# Patient Record
Sex: MALE | Race: WHITE | Employment: FULL TIME | URBAN - METROPOLITAN AREA
[De-identification: names, ages, dates, MRNs, and addresses within clinical notes are randomized per-mention and may not be internally consistent; named-entity substitution may affect disease eponyms.]

---
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
## 2021-09-09 ENCOUNTER — HOSPITAL ENCOUNTER (EMERGENCY)
Age: 21
Discharge: HOME | End: 2021-09-09
Attending: EMERGENCY MEDICINE
Payer: COMMERCIAL

## 2021-09-09 VITALS
TEMPERATURE: 96.7 F | OXYGEN SATURATION: 99 % | HEART RATE: 61 BPM | RESPIRATION RATE: 18 BRPM | WEIGHT: 208 LBS | SYSTOLIC BLOOD PRESSURE: 146 MMHG | BODY MASS INDEX: 29.12 KG/M2 | DIASTOLIC BLOOD PRESSURE: 82 MMHG | HEIGHT: 71 IN

## 2021-09-09 DIAGNOSIS — S29.019A THORACIC MYOFASCIAL STRAIN, INITIAL ENCOUNTER: ICD-10-CM

## 2021-09-09 DIAGNOSIS — S39.012A BACK STRAIN, INITIAL ENCOUNTER: Primary | ICD-10-CM

## 2021-09-09 PROCEDURE — 6370000000 HC RX 637 (ALT 250 FOR IP): Performed by: EMERGENCY MEDICINE

## 2021-09-09 PROCEDURE — 96372 THER/PROPH/DIAG INJ SC/IM: CPT

## 2021-09-09 PROCEDURE — 6360000002 HC RX W HCPCS: Performed by: EMERGENCY MEDICINE

## 2021-09-09 PROCEDURE — 99283 EMERGENCY DEPT VISIT LOW MDM: CPT

## 2021-09-09 RX ORDER — NAPROXEN 500 MG/1
500 TABLET ORAL 2 TIMES DAILY PRN
Qty: 60 TABLET | Refills: 0 | Status: SHIPPED | OUTPATIENT
Start: 2021-09-09

## 2021-09-09 RX ORDER — KETOROLAC TROMETHAMINE 30 MG/ML
30 INJECTION, SOLUTION INTRAMUSCULAR; INTRAVENOUS ONCE
Status: COMPLETED | OUTPATIENT
Start: 2021-09-09 | End: 2021-09-09

## 2021-09-09 RX ORDER — METHOCARBAMOL 500 MG/1
1000 TABLET, FILM COATED ORAL ONCE
Status: COMPLETED | OUTPATIENT
Start: 2021-09-09 | End: 2021-09-09

## 2021-09-09 RX ORDER — METHOCARBAMOL 500 MG/1
1000 TABLET, FILM COATED ORAL 2 TIMES DAILY PRN
Qty: 40 TABLET | Refills: 0 | Status: SHIPPED | OUTPATIENT
Start: 2021-09-09 | End: 2021-09-19

## 2021-09-09 RX ORDER — METHYLPREDNISOLONE SODIUM SUCCINATE 125 MG/2ML
80 INJECTION, POWDER, LYOPHILIZED, FOR SOLUTION INTRAMUSCULAR; INTRAVENOUS ONCE
Status: COMPLETED | OUTPATIENT
Start: 2021-09-09 | End: 2021-09-09

## 2021-09-09 RX ADMIN — KETOROLAC TROMETHAMINE 30 MG: 30 INJECTION, SOLUTION INTRAMUSCULAR at 09:36

## 2021-09-09 RX ADMIN — METHOCARBAMOL 1000 MG: 500 TABLET ORAL at 09:37

## 2021-09-09 RX ADMIN — METHYLPREDNISOLONE SODIUM SUCCINATE 80 MG: 125 INJECTION, POWDER, FOR SOLUTION INTRAMUSCULAR; INTRAVENOUS at 09:37

## 2021-09-09 ASSESSMENT — PAIN SCALES - GENERAL
PAINLEVEL_OUTOF10: 6
PAINLEVEL_OUTOF10: 5

## 2021-09-09 ASSESSMENT — PAIN DESCRIPTION - DESCRIPTORS: DESCRIPTORS: SHARP

## 2021-09-09 ASSESSMENT — PAIN DESCRIPTION - ORIENTATION: ORIENTATION: MID

## 2021-09-09 ASSESSMENT — PAIN DESCRIPTION - LOCATION: LOCATION: BACK

## 2021-09-09 NOTE — ED PROVIDER NOTES
[address] ED      CHIEF COMPLAINT  Back Pain (unknown origin; pain woke pt up in middle of night)       HISTORY OF PRESENT ILLNESS  Leigh Ann Abrams is a 24 y.o. male  who presents to the ED complaining of back pain. The patient states that he has a history of shoulder and back issues, he moved last night and then the pain started. He took Tylenol for pain without much relief. He states that the pain is in his left-sided mid back, states that it somewhat moves around and is now traveling to his lower back. He denies numbness, tingling, or weakness of the extremities. He denies any associated chest or abdominal pain. Denies shortness of breath. He states he occasionally has some pain radiating into his left buttock but not today. He denies fevers or chills. Denies IV drug use. Denies urinary symptoms. Denies any significant medical problems. No other complaints, modifying factors or associated symptoms. I have reviewed the following from the nursing documentation. Past Medical History:   Diagnosis Date    ADHD (attention deficit hyperactivity disorder)     Anxiety     Asperger syndrome      History reviewed. No pertinent surgical history. History reviewed. No pertinent family history.   Social History     Socioeconomic History    Marital status: Single     Spouse name: Not on file    Number of children: Not on file    Years of education: Not on file    Highest education level: Not on file   Occupational History    Not on file   Tobacco Use    Smoking status: Current Every Day Smoker    Smokeless tobacco: Never Used   Substance and Sexual Activity    Alcohol use: Yes     Comment: occ    Drug use: Yes     Types: Marijuana    Sexual activity: Not on file   Other Topics Concern    Not on file   Social History Narrative    Not on file     Social Determinants of Health     Financial Resource Strain:     Difficulty of Paying Living Expenses:    Food Insecurity:     Worried About Running Out of Food in the Last Year:     Kevin of Food in the Last Year:    Transportation Needs:     Lack of Transportation (Medical):  Lack of Transportation (Non-Medical):    Physical Activity:     Days of Exercise per Week:     Minutes of Exercise per Session:    Stress:     Feeling of Stress :    Social Connections:     Frequency of Communication with Friends and Family:     Frequency of Social Gatherings with Friends and Family:     Attends Bahai Services:     Active Member of Clubs or Organizations:     Attends Club or Organization Meetings:     Marital Status:    Intimate Partner Violence:     Fear of Current or Ex-Partner:     Emotionally Abused:     Physically Abused:     Sexually Abused:      Current Facility-Administered Medications   Medication Dose Route Frequency Provider Last Rate Last Admin    methylPREDNISolone sodium (SOLU-MEDROL) injection 80 mg  80 mg IntraMUSCular Once [employer], DO        ketorolac (TORADOL) injection 30 mg  30 mg IntraMUSCular Once [employer], DO        methocarbamol (ROBAXIN) tablet 1,000 mg  1,000 mg Oral Once [employer], DO         Current Outpatient Medications   Medication Sig Dispense Refill    methocarbamol (ROBAXIN) 500 MG tablet Take 2 tablets by mouth 2 times daily as needed (back pain) 40 tablet 0    naproxen (NAPROSYN) 500 MG tablet Take 1 tablet by mouth 2 times daily as needed for Pain 60 tablet 0    diclofenac (VOLTAREN) 50 MG EC tablet Take 1 tablet by mouth 2 times daily 14 tablet 0    ARIPiprazole (ABILIFY) 5 MG tablet Take 5 mg by mouth nightly       Allergies   Allergen Reactions    Penicillins     Sulfa Antibiotics        REVIEW OF SYSTEMS  10 systems reviewed, pertinent positives per HPI otherwise noted to be negative.     PHYSICAL EXAM  BP (!) 146/82   Pulse 60   Temp 96.7 °F (35.9 °C)   Resp 16   Ht 5' 11\" (1.803 m)   Wt 208 lb (94.3 kg)   SpO2 100%   BMI 29.01 kg/m²    Physical exam:  General appearance: awake and cooperative. No distress. Non toxic appearing. Skin: Warm and dry. No rashes or lesions. HENT: Normocephalic. Atraumatic. Mucus membranes are moist  Neck: supple  Eyes: SHAMIKA. EOM intact. Heart: RRR. No murmurs. Lungs: Respirations unlabored. CTAB. No wheezes, rales, or rhonchi. Good air exchange  Abdomen: No tenderness. Soft. Non distended. No peritoneal signs. Musculoskeletal: tenderness to palpation left thoracic paraspinals with hypertonic musculature, no midline tenderness. No extremity edema. Compartments soft. No deformity. No tenderness in the extremities. All extremities neurovascularly intact. Radial, Dp, and PT pulses +2/4 bilaterally  Neurological: Alert and oriented. No focal deficits. No aphasia or dysarthria. No gait ataxia. Psychiatric: Normal mood and affect. High Sensitivity Neuro Exam (HSNE) Spine  If Lumbar Pain (also check femoral pulses):  L1-L2 Cremasteric Reflex (inner thigh sensation): Present  L2 Adduct Thigh (cross legs): Present  L3 Extend Knee: Present  L4 Dorsiflex Ankle (Up): Present  L5 Point Great Toe Up: Present  L2 L3-L4 Knee Reflex: Present  S1 Flex Knee: Present  S2 Plantarflex Toes: Present  S3, 4, 5 Groin/Perianal Sensation: Present       If Thoracic Back Pain (also check femoral pulses):  T1 Move fingers apart: Present  T2-T12 Trunk Sensation: Present      LABS  I have reviewed all labs for this visit. No results found for this visit on 09/09/21. ECG    RADIOLOGY      ED COURSE/MDM  Patient seen and evaluated. Old records reviewed. Labs and imaging reviewed and results discussed with patient. Patient presents for back pain. Vital signs are within normal limits. He is neurologically intact and well-appearing on exam.  He appears to have a muscle strain. I do not feel imaging would add to clinical picture at this time. there is no sign of cauda equina or acute spinal cord pathology today.  He is treated symptomatically with Toradol, Robaxin and Solu-Medrol. He is given a prescription for naproxen and Robaxin for home. He is told to follow-up with primary care. He is given strict return precautions back to the ED and voices understanding. Red Flags    Minor (1 Point Each)  Alcohol Abuse: +0 No  Diabetes Mellitus: +0 No  Renal Failure: +0 No  Night Pain: +1 yes  3rd visit in last 20 days: +0 No    Major (3 Points Each)  IV Drug Use: +0 No  Fever without focus: +0 No  Recent/Current Systemic Infection: +0 No  Immunosuppression (can include chemotherapy, advanced cancer, severe malnutrition, chronic immunosuppressive drugs): +0 No  Recent Spinal Fracture/Procedure (if patient is also on anticoagulation [warfarin, heparin, and NOAC] strongly consider MRI): +0 No  Incontinence or retention: +0 No  Indwelling urinary catheter: +0 No    Red flag score: 1    Patient presents to ED for evaluation of back pain. No history of direct trauma. Neurovascular exam in the ER is unremarkable for any deficits. Vitals signs have been reviewed and are stable. They are afebrile and nontoxic appearing, but in moderate distress due to pain. Pain was addressed with pain medication. HSNE Spine was without abnormal findings and Red Flag Score evaluated. Red flag score was less than or equal to 3 therefore ESR was not ordered. Further imaging was not ordered because red flag score was less than or equal to 3..      I completed a structured, evidence-based clinical evaluation to screen for acute non-traumatic spinal emergencies. The patient has a normal detailed neurologic exam and a low red flag score. The evidence indicates that the patient is very low risk for an acute spinal emergency and this is consistent with my clinical intuition.  The risk of further workup or hospitalization is likely higher than the likelihood of the patient having a spinal epidural abscess or other dangerous emergency spinal condition It is, therefore, in the patients best interest not to do additional emergent testing. During the patient's ED course, the patient was given:  Medications   methylPREDNISolone sodium (SOLU-MEDROL) injection 80 mg (has no administration in time range)   ketorolac (TORADOL) injection 30 mg (has no administration in time range)   methocarbamol (ROBAXIN) tablet 1,000 mg (has no administration in time range)        CLINICAL IMPRESSION  1. Back strain, initial encounter    2. Thoracic myofascial strain, initial encounter        Blood pressure (!) 146/82, pulse 60, temperature 96.7 °F (35.9 °C), resp. rate 16, height 5' 11\" (1.803 m), weight 208 lb (94.3 kg), SpO2 100 %. Patient was given scripts for the following medications. I counseled patient how to take these medications. New Prescriptions    METHOCARBAMOL (ROBAXIN) 500 MG TABLET    Take 2 tablets by mouth 2 times daily as needed (back pain)    NAPROXEN (NAPROSYN) 500 MG TABLET    Take 1 tablet by mouth 2 times daily as needed for Pain       Follow-up with:  Ana [address] [place] [address] Dr Gwyn Cox 19 [phone]    Call in 1 day        DISCLAIMER: This chart was created using Dragon dictation software. Efforts were made by me to ensure accuracy, however some errors may be present due to limitations of this technology and occasionally words are not transcribed correctly.            Magdaleno Albarran, [place]  09/09/21 [id number]

## 2021-09-09 NOTE — DISCHARGE INSTR - COC
Continuity of Care Form    Patient Name: Juaquin Raya   :  2000  MRN:  [id number]    Admit date:  2021  Discharge date:  ***    Code Status Order: No Order   Advance Directives:     Admitting Physician:  No admitting provider for patient encounter. PCP: Keaton Phelan    Discharging Nurse: [facility] Unit/Room#: T1-3/T1  Discharging Unit Phone Number: ***    Emergency Contact:   Extended Emergency Contact Information  Primary Emergency Contact: Noella Bloch [address] Phone: [phone]  Relation: Parent  Secondary Emergency Contact: Urszula Mortensen [address] Phone: [phone]  Relation: Parent    Past Surgical History:  History reviewed. No pertinent surgical history. Immunization History: There is no immunization history on file for this patient.     Active Problems:  Patient Active Problem List   Diagnosis Code    Closed fracture of distal end of ulna (alone) S52.609A       Isolation/Infection:   Isolation          No Isolation        Patient Infection Status     None to display          Nurse Assessment:  Last Vital Signs: BP (!) 146/82   Pulse 61   Temp 96.7 °F (35.9 °C)   Resp 18   Ht 5' 11\" (1.803 m)   Wt 208 lb (94.3 kg)   SpO2 99%   BMI 29.01 kg/m²     Last documented pain score (0-10 scale): Pain Level: 6  Last Weight:   Wt Readings from Last 1 Encounters:   21 208 lb (94.3 kg)     Mental Status:  {IP PT MENTAL STATUS:31455}    IV Access:  { MARIA INES IV ACCESS:672649378}    Nursing Mobility/ADLs:  Walking   {CHP DME ZEDA:061601081}  Transfer  {CHP DME AHMX:825673376}  Bathing  {CHP DME TAGA:345198485}  Dressing  {CHP DME VSEB:786151241}  Toileting  {CHP DME QWMT:993102983}  Feeding  {CHP DME TOGI:486892308}  Med Admin  {CHP DME KYCO:744617633}  Med Delivery   { MARIA INES MED Delivery:889705486}    Wound Care Documentation and Therapy:        Elimination:  Continence:   · Bowel: {YES / UT:16733}  · Bladder: {YES / LS:22929}  Urinary Catheter: {Urinary Catheter:651073793}   Colostomy/Ileostomy/Ileal Conduit: {YES / NH:30570}       Date of Last BM: ***  No intake or output data in the 24 hours ending 21 0939  No intake/output data recorded.     Safety Concerns:     [address] MARIA INES Safety Concerns:762897003}    Impairments/Disabilities:      [address] [facility] Impairments/Disabilities:750865662}    Nutrition Therapy:  Current Nutrition Therapy:   [address] [facility] Diet List:741831394}    Routes of Feeding: {CHP DME Other Feedings:073703759}  Liquids: {Slp liquid thickness:05751}  Daily Fluid Restriction: {CHP DME Yes amt example:930579180}  Last Modified Barium Swallow with Video (Video Swallowing Test): {Done Not Done XWXB:903874556}    Treatments at the Time of Hospital Discharge:   Respiratory Treatments: ***  Oxygen Therapy:  {Therapy; copd oxygen:17703}  Ventilator:    { CC Vent UDT}    Rehab Therapies: {THERAPEUTIC INTERVENTION:9066302642}  Weight Bearing Status/Restrictions: [address] [place] [address] Weight Bearin}  Other Medical Equipment (for information only, NOT a DME order):  {EQUIPMENT:979193790}  Other Treatments: ***    Patient's personal belongings (please select all that are sent with patient):  {[facility] DME Belongings:733544984}    RN SIGNATURE:  {Esignature:711862183}    CASE MANAGEMENT/SOCIAL WORK SECTION    Inpatient Status Date: ***    Readmission Risk Assessment Score:  Readmission Risk              Risk of Unplanned Readmission:  0           Discharging to Facility/ Agency   · Name:   · Address:  · Phone:  · Fax:    Dialysis Facility (if applicable)   · Name:  · Address:  · Dialysis Schedule:  · Phone:  · Fax:    / signature: {Esignature:654142687}    PHYSICIAN SECTION    Prognosis: {Prognosis:6171327850}    Condition at Discharge: [address] Patient Condition:729810903}    Rehab Potential (if transferring to Rehab): {Prognosis:0720878212}    Recommended Labs or Other Treatments After Discharge: ***    Physician Certification: I certify the above information and transfer of Jazmyn Cintron  is necessary for the continuing treatment of the diagnosis listed and that he requires {Admit to Appropriate Level of Care:98275} for {GREATER/LESS:693383922} 30 days.      Update Admission H&P: {CHP DME Changes in MSQZQ:848727540}    PHYSICIAN SIGNATURE:  {Esignature:067187200}

## 2021-09-10 ENCOUNTER — OFFICE VISIT (OUTPATIENT)
Dept: PRIMARY CARE CLINIC | Age: 21
End: 2021-09-10
Payer: COMMERCIAL

## 2021-09-10 VITALS
SYSTOLIC BLOOD PRESSURE: 122 MMHG | DIASTOLIC BLOOD PRESSURE: 76 MMHG | HEIGHT: 70 IN | TEMPERATURE: 98.5 F | WEIGHT: 225 LBS | BODY MASS INDEX: 32.21 KG/M2

## 2021-09-10 DIAGNOSIS — F32.A DEPRESSION, UNSPECIFIED DEPRESSION TYPE: ICD-10-CM

## 2021-09-10 DIAGNOSIS — F17.200 TOBACCO USE DISORDER: ICD-10-CM

## 2021-09-10 DIAGNOSIS — F12.20 TETRAHYDROCANNABINOL (THC) USE DISORDER, MODERATE, DEPENDENCE (HCC): ICD-10-CM

## 2021-09-10 DIAGNOSIS — F90.9 ATTENTION DEFICIT HYPERACTIVITY DISORDER (ADHD), UNSPECIFIED ADHD TYPE: ICD-10-CM

## 2021-09-10 DIAGNOSIS — E66.9 OBESITY (BMI 30-39.9): ICD-10-CM

## 2021-09-10 DIAGNOSIS — F19.11 HISTORY OF SUBSTANCE ABUSE (HCC): ICD-10-CM

## 2021-09-10 DIAGNOSIS — Z76.89 ESTABLISHING CARE WITH NEW DOCTOR, ENCOUNTER FOR: ICD-10-CM

## 2021-09-10 DIAGNOSIS — F41.9 ANXIETY: ICD-10-CM

## 2021-09-10 DIAGNOSIS — S39.012A BACK STRAIN, INITIAL ENCOUNTER: Primary | ICD-10-CM

## 2021-09-10 PROCEDURE — 99203 OFFICE O/P NEW LOW 30 MIN: CPT

## 2021-09-10 ASSESSMENT — PATIENT HEALTH QUESTIONNAIRE - PHQ9
1. LITTLE INTEREST OR PLEASURE IN DOING THINGS: 0
2. FEELING DOWN, DEPRESSED OR HOPELESS: 0
SUM OF ALL RESPONSES TO PHQ9 QUESTIONS 1 & 2: 0
SUM OF ALL RESPONSES TO PHQ QUESTIONS 1-9: 0
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
## 2021-09-10 NOTE — PATIENT INSTRUCTIONS
Patient Education        Learning About Relief for Back Pain  What is back strain? Back strain is an injury that happens when you overstretch, or pull, a muscle in your back. You may hurt your back in an accident or when you exercise or lift something. Most back pain gets better with rest and time. You can take care of yourself at home to help your back heal.  What can you do first to relieve back pain? When you first feel back pain, try these steps:  · Walk. Take a short walk (10 to 20 minutes) on a level surface (no slopes, hills, or stairs) every 2 to 3 hours. Walk only distances you can manage without pain, especially leg pain. · Relax. Find a comfortable position for rest. Some people are comfortable on the floor or a medium-firm bed with a small pillow under their head and another under their knees. Some people prefer to lie on their side with a pillow between their knees. Don't stay in one position for too long. · Try heat or ice. Try using a heating pad on a low or medium setting, or take a warm shower, for 15 to 20 minutes every 2 to 3 hours. Or you can buy single-use heat wraps that last up to 8 hours. You can also try an ice pack for 10 to 15 minutes every 2 to 3 hours. You can use an ice pack or a bag of frozen vegetables wrapped in a thin towel. There is not strong evidence that either heat or ice will help, but you can try them to see if they help. You may also want to try switching between heat and cold. · Take pain medicine exactly as directed. ? If the doctor gave you a prescription medicine for pain, take it as prescribed. ? If you are not taking a prescription pain medicine, ask your doctor if you can take an over-the-counter medicine. What else can you do? · Stretch and exercise. Exercises that increase flexibility may relieve your pain and make it easier for your muscles to keep your spine in a good, neutral position. And don't forget to keep walking. · Do self-massage.  You can

## 2021-09-11 ASSESSMENT — ENCOUNTER SYMPTOMS
COUGH: 0
VOMITING: 0
SORE THROAT: 0
BACK PAIN: 1
ABDOMINAL PAIN: 0
SHORTNESS OF BREATH: 0
NAUSEA: 0
DIARRHEA: 0

## 2023-03-08 ENCOUNTER — HOSPITAL ENCOUNTER (EMERGENCY)
Age: 23
Discharge: HOME | End: 2023-03-08
Attending: STUDENT IN AN ORGANIZED HEALTH CARE EDUCATION/TRAINING PROGRAM

## 2023-03-08 ENCOUNTER — APPOINTMENT (OUTPATIENT)
Dept: GENERAL RADIOLOGY | Age: 23
End: 2023-03-08

## 2023-03-08 VITALS
TEMPERATURE: 97.9 F | HEART RATE: 55 BPM | WEIGHT: 200 LBS | BODY MASS INDEX: 27.09 KG/M2 | RESPIRATION RATE: 16 BRPM | HEIGHT: 72 IN | OXYGEN SATURATION: 100 % | SYSTOLIC BLOOD PRESSURE: 128 MMHG | DIASTOLIC BLOOD PRESSURE: 75 MMHG

## 2023-03-08 DIAGNOSIS — M25.512 ACUTE PAIN OF LEFT SHOULDER: Primary | ICD-10-CM

## 2023-03-08 PROCEDURE — 96374 THER/PROPH/DIAG INJ IV PUSH: CPT

## 2023-03-08 PROCEDURE — 6360000002 HC RX W HCPCS: Performed by: STUDENT IN AN ORGANIZED HEALTH CARE EDUCATION/TRAINING PROGRAM

## 2023-03-08 PROCEDURE — 99284 EMERGENCY DEPT VISIT MOD MDM: CPT

## 2023-03-08 PROCEDURE — 73030 X-RAY EXAM OF SHOULDER: CPT

## 2023-03-08 PROCEDURE — 6370000000 HC RX 637 (ALT 250 FOR IP): Performed by: STUDENT IN AN ORGANIZED HEALTH CARE EDUCATION/TRAINING PROGRAM

## 2023-03-08 RX ORDER — KETOROLAC TROMETHAMINE 30 MG/ML
15 INJECTION, SOLUTION INTRAMUSCULAR; INTRAVENOUS ONCE
Status: COMPLETED | OUTPATIENT
Start: 2023-03-08 | End: 2023-03-08

## 2023-03-08 RX ORDER — ACETAMINOPHEN 325 MG/1
650 TABLET ORAL ONCE
Status: COMPLETED | OUTPATIENT
Start: 2023-03-08 | End: 2023-03-08

## 2023-03-08 RX ADMIN — KETOROLAC TROMETHAMINE 15 MG: 30 INJECTION, SOLUTION INTRAMUSCULAR at 07:15

## 2023-03-08 RX ADMIN — ACETAMINOPHEN 650 MG: 325 TABLET ORAL at 07:15

## 2023-03-08 NOTE — DISCHARGE INSTRUCTIONS
Follow-up with orthopedic physician. Call them today to set up follow-up appointment as soon as able. I want you to ice your shoulder 20 minutes 3 times daily as well as rest.  Return to emergency department if you begin having fevers, redness around the site, difficulty ranging the joint, any neck or back pain, motor or sensory changes, chest pain or shortness of breath or any new change or worsening symptoms were always here for evaluation never hesitate to return.   Take Motrin Tylenol at home for pain control

## 2023-03-08 NOTE — Clinical Note
Emma Allen was seen and treated in our emergency department on 3/8/2023. He may return to work on 03/09/2023. If you have any questions or concerns, please don't hesitate to call.       Geneva Norman MD

## 2023-03-08 NOTE — ED PROVIDER NOTES
Emergency Department Encounter    Patient: Lenka Garnica  MRN: [id number]  : 2000  Date of Evaluation: 3/8/2023  ED Provider:  Kay Mayo MD    Triage Chief Complaint:   Shoulder Pain (Pt with Left shoulder pain that he states is shooting down the back of arm to elbow. States he has a prior injury on same shoulder. Pt states he did nothing to aggravate the shoulder. )    [place]:  Lenka Garnica is a 24 y.o. male with history seen below presenting with left shoulder pain. Patient states he has had intermittent pain in his left shoulder previously. Patient states overnight he began having pain over the anterior and posterior aspect of his left shoulder which has been constant, stabbing, worse with palpation and certain movements without relieving factors. Patient states he has intermittent shooting pain down to his elbow. Denies any tenderness over the mid to distal humerus, elbow, forearm, wrist or hand. Denies any neck pain or paraspinous pain. Denies recent falls or trauma. Denies headache, blurred vision, focal neurodeficits, lightheadedness or dizziness. Denies any pain along the CT or L-spine. Denies any chest pain, shortness of breath, cough, sputum production. Denies abdominal pain, nausea vomiting, diarrhea constipation or urinary symptoms. Patient denies any significant motor or sensory changes. Denies any bowel or bladder changes or incontinence denies fevers or chills or infectious symptoms. ROS - see HPI, below listed is current ROS at time of my eval:  At least 14 systems reviewed, negative other HPI    Past Medical History:   Diagnosis Date    ADHD (attention deficit hyperactivity disorder)     Anxiety     Asperger syndrome      Past Surgical History:   Procedure Laterality Date    HAND SURGERY Right     5th digit metacarpal    KNEE CARTILAGE SURGERY Left     WISDOM TOOTH EXTRACTION       History reviewed. No pertinent family history.   Social History     Socioeconomic History    Marital status: Single     Spouse name: Not on file    Number of children: Not on file    Years of education: Not on file    Highest education level: Not on file   Occupational History    Not on file   Tobacco Use    Smoking status: Every Day     Types: Cigarettes    Smokeless tobacco: Never    Tobacco comments:     vape   Vaping Use    Vaping Use: Every day    Substances: Nicotine   Substance and Sexual Activity    Alcohol use: Yes     Comment: occ    Drug use: Yes     Types: Marijuana Trotterroma Castro)     Comment: 2-3g/day    Sexual activity: Not on file   Other Topics Concern    Not on file   Social History Narrative    Not on file     Social Determinants of Health     Financial Resource Strain: Not on file   Food Insecurity: Not on file   Transportation Needs: Not on file   Physical Activity: Not on file   Stress: Not on file   Social Connections: Not on file   Intimate Partner Violence: Not on file   Housing Stability: Not on file     Current Facility-Administered Medications   Medication Dose Route Frequency Provider Last Rate Last Admin    ketorolac (TORADOL) injection 15 mg  15 mg IntraVENous Once Rula Flores MD        acetaminophen (TYLENOL) tablet 650 mg  650 mg Oral Once Rula Flores MD         Current Outpatient Medications   Medication Sig Dispense Refill    naproxen (NAPROSYN) 500 MG tablet Take 1 tablet by mouth 2 times daily as needed for Pain (Patient not taking: No sig reported) 60 tablet 0    diclofenac (VOLTAREN) 50 MG EC tablet Take 1 tablet by mouth 2 times daily (Patient not taking: No sig reported) 14 tablet 0    ARIPiprazole (ABILIFY) 5 MG tablet Take 5 mg by mouth nightly (Patient not taking: No sig reported)       Allergies   Allergen Reactions    Penicillins     Sulfa Antibiotics        Nursing Notes Reviewed    Physical Exam:  Triage VS:    ED Triage Vitals   Enc Vitals Group      BP 03/08/23 0644 128/75      Heart Rate 03/08/23 0644 55      Resp 03/08/23 0644 16 Temp 03/08/23 0644 97.9 °F (36.6 °C)      Temp Source 03/08/23 0644 Oral      SpO2 03/08/23 0644 100 %      Weight 03/08/23 0647 200 lb (90.7 kg)      Height 03/08/23 0647 6' (1.829 m)      Head Circumference --       Peak Flow --       Pain Score --       Pain Loc --       Pain Edu? --       Excl. in [address]? --        My pulse ox interpretation is - normal    General appearance:  No acute distress. Skin:  Warm. Dry. Eye:  Extraocular movements intact. Ears, nose, mouth and throat:  Oral mucosa moist   Neck:  Trachea midline. Extremity:  No swelling. Normal ROM, reproducible tenderness palpation over the anterior as well as posterior aspect of the left shoulder, no erythema, fluctuance, purulence, no swelling patient has normal range of motion of the joint patient has 5 out of 5 motor strength in the joint as well as normal strength with flexion extension at the wrist and elbow patient has normal sensation light touch as well as normal sensorimotor function the medial radial and ulnar distribution patient has easily palpable distal pulses with less than 2-second cap refill there is no tenderness palpation along the C-spine of the paraspinous region     Heart:  Regular rate and rhythm, normal S1 & S2, no extra heart sounds. Perfusion:  intact  Respiratory:  Lungs clear to auscultation bilaterally. Respirations nonlabored. Abdominal:  Normal bowel sounds. Soft. Nontender. Non distended. Back:  No CVA tenderness to palpation     Neurological:  Alert and oriented times 3. No focal neuro deficits. Psychiatric:  Appropriate    I have reviewed and interpreted all of the currently available lab results from this visit (if applicable):  No results found for this visit on 03/08/23.    Radiographs (if obtained):  Radiologist's Report Reviewed:  XR SHOULDER LEFT (MIN 2 VIEWS)    Result Date: 3/8/2023  EXAMINATION: 2 XRAY VIEWS OF THE LEFT SHOULDER 3/8/2023 6:53 am COMPARISON: 05/31/2015 HISTORY: ORDERING SYSTEM PROVIDED HISTORY: left shoulder pain radiating down elbow TECHNOLOGIST PROVIDED HISTORY: Reason for exam:->left shoulder pain radiating down elbow Reason for Exam: sharp pain radiating down arm no injury FINDINGS: No acute displaced fracture or dislocation. Joint spaces appear preserved. Visualized lung appears aerated. No radiopaque foreign bodies. No convincing acute osseous abnormality. MDM:    48-year-old male presenting with left shoulder pain. Extremity seen above. Vitals on presentation are reassuring and patient afebrile satting well on room air. Physical exam shows no signs of septic joint there is no tenderness palpation along the C-spine or paraspinous region. Patient does have reproducible tenderness palpation of the anterior most posterior left shoulder patient is neurovascular intact distally. X-ray is nonacute. I discussed symptomatic care with patient as well as close follow-up with orthopedics and patient is agreeable to plan. I discussed strict return precautions and patient agreeable to return precautions including but not limited to redness around the joint, fevers, decreased range of motion of the joint, neck or back pain, motor or sensory changes or bowel or bladder changes. Patient discharged home. Clinical Impression:  1. Acute pain of left shoulder            Comment: Please note this report has been produced using speech recognition software and may contain errors related to that system including errors in grammar, punctuation, and spelling, as well as words and phrases that may be inappropriate. Efforts were made to edit the dictations.         Abner Waldron MD  03/09/23 2848